# Patient Record
Sex: MALE | NOT HISPANIC OR LATINO | URBAN - METROPOLITAN AREA
[De-identification: names, ages, dates, MRNs, and addresses within clinical notes are randomized per-mention and may not be internally consistent; named-entity substitution may affect disease eponyms.]

---

## 2017-01-25 ENCOUNTER — IMPORTED ENCOUNTER (OUTPATIENT)
Dept: URBAN - METROPOLITAN AREA CLINIC 1 | Facility: CLINIC | Age: 72
End: 2017-01-25

## 2017-01-25 PROBLEM — H25.13: Noted: 2017-01-25

## 2017-01-25 PROBLEM — H35.362: Noted: 2017-01-25

## 2017-01-25 PROBLEM — E11.3293: Noted: 2017-01-25

## 2017-01-25 PROBLEM — Z96.1: Noted: 2017-01-25

## 2017-01-25 PROCEDURE — 92004 COMPRE OPH EXAM NEW PT 1/>: CPT

## 2017-01-25 NOTE — PATIENT DISCUSSION
1.  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 Stressed the importance of strong glucose control. Advised of importance of at least yearly dilated examinations but to contact us immediately for any problems or concerns. 2. Cataract OU: Observe for now without intervention. The patient was advised to contact us if any change or worsening of vision. Change glasses. Advised pt once gets glasses try closing OS to see if helps with symptoms. Will re evaluate re symptoms of visual confusion. 3. Pseudophakia OD - 4. Macular Drusen OS-observe5. Return for an appointment in 4 months for 10 glare with Dr. Yoandy Keene.

## 2017-02-24 ENCOUNTER — OFFICE VISIT (OUTPATIENT)
Dept: CARDIOLOGY CLINIC | Age: 72
End: 2017-02-24

## 2017-02-24 VITALS
OXYGEN SATURATION: 97 % | SYSTOLIC BLOOD PRESSURE: 136 MMHG | BODY MASS INDEX: 30.61 KG/M2 | HEART RATE: 71 BPM | WEIGHT: 226 LBS | HEIGHT: 72 IN | DIASTOLIC BLOOD PRESSURE: 63 MMHG

## 2017-02-24 DIAGNOSIS — I25.10 CORONARY ARTERY DISEASE DUE TO LIPID RICH PLAQUE: Primary | Chronic | ICD-10-CM

## 2017-02-24 DIAGNOSIS — E11.8 TYPE 2 DIABETES MELLITUS WITH COMPLICATION, WITH LONG-TERM CURRENT USE OF INSULIN (HCC): ICD-10-CM

## 2017-02-24 DIAGNOSIS — Z79.4 TYPE 2 DIABETES MELLITUS WITH COMPLICATION, WITH LONG-TERM CURRENT USE OF INSULIN (HCC): ICD-10-CM

## 2017-02-24 DIAGNOSIS — I25.83 CORONARY ARTERY DISEASE DUE TO LIPID RICH PLAQUE: Primary | Chronic | ICD-10-CM

## 2017-02-24 DIAGNOSIS — I10 ESSENTIAL HYPERTENSION: ICD-10-CM

## 2017-02-24 DIAGNOSIS — I50.33 ACUTE ON CHRONIC DIASTOLIC HEART FAILURE (HCC): ICD-10-CM

## 2017-02-24 DIAGNOSIS — I73.9 PVD (PERIPHERAL VASCULAR DISEASE) (HCC): ICD-10-CM

## 2017-02-24 DIAGNOSIS — N18.5 CHRONIC KIDNEY DISEASE, STAGE V (HCC): ICD-10-CM

## 2017-02-24 DIAGNOSIS — I21.4 NSTEMI (NON-ST ELEVATED MYOCARDIAL INFARCTION) (HCC): ICD-10-CM

## 2017-02-24 DIAGNOSIS — E78.5 DYSLIPIDEMIA: ICD-10-CM

## 2017-02-24 NOTE — MR AVS SNAPSHOT
Visit Information Date & Time Provider Department Dept. Phone Encounter #  
 2/24/2017 10:30 AM Daya Kaiser MD [address] [place] [address] Specialist at [facility]/[address] [phone] [id number] Follow-up Instructions Return in about 10 weeks (around 5/5/2017). Your Appointments 5/3/2017 10:30 AM  
Follow Up with Daya Kaiser MD  
Cardio Specialist at [facility] [address]) Appt Note: 10 week f/u  
 [address]  
  
   
 [address] Upcoming Health Maintenance Date Due Hepatitis C Screening 1945 FOOT EXAM Q1 6/30/1956 MICROALBUMIN Q1 6/30/1956 EYE EXAM RETINAL OR DILATED Q1 6/30/1956 DTaP/Tdap/Td series (1 - Tdap) 6/30/1967 FOBT Q 1 YEAR AGE 50-75 6/30/1996 ZOSTER VACCINE AGE 60> 6/30/2006 GLAUCOMA SCREENING Q2Y 6/30/2011 Pneumococcal 65+ High/Highest Risk (1 of 2 - PCV13) 6/30/2011 MEDICARE YEARLY EXAM 6/30/2011 INFLUENZA AGE 9 TO ADULT 8/1/2016 HEMOGLOBIN A1C Q6M 6/27/2017 LIPID PANEL Q1 12/27/2017 Allergies as of 2/24/2017  Review Complete On: 2/24/2017 By: Latoya Mcfarland LPN No Known Allergies Current Immunizations  Never Reviewed No immunizations on file. Not reviewed this visit Vitals BP  
  
  
  
  
  
 136/63 Vitals History BMI and BSA Data Body Mass Index Body Surface Area  
 30.65 kg/m 2 2.28 m 2 Your Updated Medication List  
  
   
This list is accurate as of: 2/24/17 10:58 AM.  Always use your most recent med list.  
  
  
  
  
 allopurinol 100 mg tablet Commonly known as:  Sacha Needle Take 100 mg by mouth daily. amLODIPine 10 mg tablet Commonly known as:  Izetta Harder Take 1 Tab by mouth daily. aspirin delayed-release 325 mg tablet Take 325 mg by mouth every six (6) hours as needed for Pain. atorvastatin 80 mg tablet Commonly known as:  LIPITOR Take 80 mg by mouth daily. carvedilol 25 mg tablet Commonly known as:  Robert Bourdon Take 2 Tabs by mouth two (2) times daily (with meals). cholecalciferol 1,000 unit Cap Commonly known as:  VITAMIN D3 Take 3,000 Units/day by mouth daily. COQ10  100-100 mg-unit Cap Generic drug:  coenzyme q10-vitamin e Take 100 mg by mouth daily. isosorbide mononitrate ER 30 mg tablet Commonly known as:  IMDUR Take 1 Tab by mouth daily. LANTUS SC  
20 Units by SubCUTAneous route two (2) times a day. LEVOTHROID PO Take 0.15 mg by mouth daily. metoclopramide HCl 10 mg tablet Commonly known as:  REGLAN Take 15 mg by mouth Before breakfast, lunch, dinner and at bedtime. NOVOLOG SC  
4 Units by SubCUTAneous route three (3) times daily. PRECISION XTRA TEST strip Generic drug:  glucose blood VI test strips 1 Each by Does Not Apply route See Admin Instructions. tamsulosin 0.4 mg capsule Commonly known as:  FLOMAX Take 0.4 mg by mouth daily. Follow-up Instructions Return in about 10 weeks (around 5/5/2017). Introducing hospitals & HEALTH SERVICES! Ricci Polanco introduces [employer] patient portal. Now you can access parts of your medical record, email your doctor's office, and request medication refills online. 1. In your internet browser, go to [email or web address] 2. Click on the First Time User? Click Here link in the Sign In box. You will see the New Member Sign Up page. 3. Enter your [employer] Access Code exactly as it appears below. You will not need to use this code after youve completed the sign-up process. If you do not sign up before the expiration date, you must request a new code. · [employer] Access Code: [email or web address] Expires: 3/26/2017 12:40 PM 
 
4.  Enter the last four digits of your Social Security Number (xxxx) and Date of Birth (mm/dd/yyyy) as indicated and click Submit. You will be taken to the next sign-up page. 5. Create a [employer] ID. This will be your [employer] login ID and cannot be changed, so think of one that is secure and easy to remember. 6. Create a [employer] password. You can change your password at any time. 7. Enter your Password Reset Question and Answer. This can be used at a later time if you forget your password. 8. Enter your e-mail address. You will receive e-mail notification when new information is available in [address] 9. Click Sign Up. You can now view and download portions of your medical record. 10. Click the Download Summary menu link to download a portable copy of your medical information. If you have questions, please visit the Frequently Asked Questions section of the [employer] website. Remember, [employer] is NOT to be used for urgent needs. For medical emergencies, dial 911. Now available from your iPhone and Android! Please provide this summary of care documentation to your next provider. Your primary care clinician is listed as [address]. If you have any questions after today's visit, please call [phone].

## 2017-02-24 NOTE — LETTER
3/23/2017 Patient:  Avril Haddad YOB: 1945 Date of Visit: 2/24/2017 Dear Guevara Argueta MD 
[address] [facility] 
[place] [address] VIA Facsimile: [phone] 
 : Thank you for referring Mr. Sadie Dominguez to me for evaluation/treatment. Below are the relevant portions of my assessment and plan of care. Subjective:  
   Sadie Dominguez is in the office for cardiac reevaluation. He is a 60-year-old man that was initially referred for revascularization. After his cardiac catheterization cines were obtained from [place], [place] and were reviewed, it was felt that possibly medical therapy would not be an unreasonable option. His catheterization was done on 09/14/2014 so by the time I saw him, a good deal of time had transpired. Recently (12/16), he was hospitalized at [facility] for some change in mental status. He has chronic kidney disease stage 4. He tells me now that he has started dialysis. He says that he was informed at the hospital that his kidneys were nonfunctioning. He has been dialyzed since that time. He has a home health nurse that comes out once per week. His legs limit him more than anything. He does not have any chest discomfort. He has had minimal shortness of breath, but he seems to be fairly sedentary. He was started on Ranexa during that [facility] hospitalization. He is about to have his arm access graft completed some time in the near future. Patient Active Problem List  
 Diagnosis Date Noted  Acute on chronic diastolic heart failure ([address] 12/29/2016  
 NSTEMI (non-ST elevated myocardial infarction) ([address] 12/26/2016  CAD (coronary artery disease) 06/22/2015  
 HTN (hypertension) 06/22/2015  Dyslipidemia 06/22/2015  Diabetes ([address] 06/22/2015  Chronic kidney disease, stage V ([address] 06/22/2015  PVD (peripheral vascular disease) ([address] 06/22/2015  Hypothyroidism 06/22/2015  Diabetic foot ulcer ([facility] 06/22/2015  Diabetic neuropathy ([facility] 06/22/2015 Current Outpatient Prescriptions Medication Sig Dispense Refill  isosorbide mononitrate ER (IMDUR) 30 mg tablet Take 1 Tab by mouth daily. 30 Tab 0  
 amLODIPine (NORVASC) 10 mg tablet Take 1 Tab by mouth daily. 30 Tab 0  carvedilol (COREG) 25 mg tablet Take 2 Tabs by mouth two (2) times daily (with meals). 60 Tab 0  cholecalciferol (VITAMIN D3) 1,000 unit cap Take 3,000 Units/day by mouth daily.  glucose blood VI test strips (PRECISION XTRA TEST) strip 1 Each by Does Not Apply route See Admin Instructions.  coenzyme q10-vitamin e (COQ10 ) 100-100 mg-unit cap Take 100 mg by mouth daily.  LEVOTHYROXINE SODIUM (LEVOTHROID PO) Take 0.15 mg by mouth daily.  aspirin delayed-release 325 mg tablet Take 325 mg by mouth every six (6) hours as needed for Pain.  INSULIN GLARGINE,HUM. REC. ANLOG (LANTUS SC) 20 Units by SubCUTAneous route two (2) times a day.  INSULIN ASPART (NOVOLOG SC) 4 Units by SubCUTAneous route three (3) times daily.  atorvastatin (LIPITOR) 80 mg tablet Take 80 mg by mouth daily.  metoclopramide HCl (REGLAN) 10 mg tablet Take 15 mg by mouth Before breakfast, lunch, dinner and at bedtime.  tamsulosin (FLOMAX) 0.4 mg capsule Take 0.4 mg by mouth daily.  allopurinol (ZYLOPRIM) 100 mg tablet Take 100 mg by mouth daily. No Known Allergies Past Medical History:  
Diagnosis Date  Arrhythmia  [facility]  GERD (gastroesophageal reflux disease)  Gout  High cholesterol  Hypertension  Thyroid disease Past Surgical History:  
Procedure Laterality Date  HX MOHS PROCEDURES Left  HX SHOULDER ARTHROSCOPY No family history on file. History Smoking Status  Never Smoker Smokeless Tobacco  
 Not on file Review of Systems, additional: 
Constitutional: negative Eyes: negative Respiratory: negative Cardiovascular: positive for fatigue, dyspnea on exertion Gastrointestinal: negative Musculoskeletal:negative Neurological: negative Behvioral/Psych: negative Endocrine: negative ENT: negative Objective:  
 
Visit Vitals  /63  Pulse 71  
 Ht 6' (1.829 m)  Wt 102.5 kg (226 lb)  SpO2 97%  BMI 30.65 kg/m2 General:  alert, cooperative, no distress Chest Wall: inspection normal - no chest wall deformities or tenderness, respiratory effort normal  
Lung: clear to auscultation bilaterally Heart:  normal rate and regular rhythm, S1 and S2 normal, no murmurs noted, no gallops noted Abdomen: soft, non-tender. Bowel sounds normal. No masses,  no organomegaly Extremities: extremities normal, atraumatic, no cyanosis or edema Skin: no rashes Neuro: alert, oriented, normal speech, no focal findings or movement disorder noted EK2016; Sinus rhythm. LVE with secondary ST & T wave abnormalities. Assessment/Plan: ICD-10-CM ICD-9-CM 1. Coronary artery disease due to lipid rich plaque I25.10 414.00 I25.83 414.3 2. Essential hypertension, controlled in office today I10 401.9 3. PVD (peripheral vascular disease) ([facility]) I73.9 443.9 4. NSTEMI (non-ST elevated myocardial infarction) [facility]), recently hospitalized at [facility], medical therapy continued I21.4 410.70   
5. Acute on chronic diastolic heart failure ([facility]) I50.33 428.33   
6. Type 2 diabetes mellitus with complication, with long-term current use of insulin ([facility]) E11.8 250.90   
 Z79.4 V58.67   
7. Dyslipidemia E78.5 272.4 8. Chronic kidney disease, stage V ([facility] patient now on hemodialysis N18.5 585.5   
9       Heart failure, chronic, diastolic If you have questions, please do not hesitate to call me. I look forward to following Mr. Gutierrez Luisa along with you. Sincerely, Abdon Harris MD

## 2017-02-24 NOTE — PROGRESS NOTES
1. Have you been to the ER, urgent care clinic since your last visit? Hospitalized since your last visit? Yes When: Dec 2016 Where: [facility] ER Reason for visit: ? mild heart attack, ended up being kidney issue    2. Have you seen or consulted any other health care providers outside of the [address] [place] [address] since your last visit? Include any pap smears or colon screening.  No

## 2017-02-26 PROBLEM — I51.7 LVH (LEFT VENTRICULAR HYPERTROPHY): Status: ACTIVE | Noted: 2017-02-26

## 2017-02-26 NOTE — PROGRESS NOTES
Subjective:      Radha Bailey is in the office for cardiac reevaluation. He is a 46-year-old man that was initially referred for revascularization. After his cardiac catheterization cines were obtained from [place], [place] and were reviewed, it was felt that possibly medical therapy would not be an unreasonable option. His catheterization was done on 09/14/2014 so by the time I saw him, a good deal of time had transpired. Recently (12/16), he was hospitalized at [facility] for some change in mental status. He has chronic kidney disease stage 4. He tells me now that he has started dialysis. He says that he was informed at the hospital that his kidneys were nonfunctioning. He has been dialyzed since that time. He has a home health nurse that comes out once per week. His legs limit him more than anything. He does not have any chest discomfort. He has had minimal shortness of breath, but he seems to be fairly sedentary. He was started on Ranexa during that [facility] hospitalization. He is about to have his arm access graft completed some time in the near future. Patient Active Problem List    Diagnosis Date Noted    Acute on chronic diastolic heart failure ([address] 12/29/2016    NSTEMI (non-ST elevated myocardial infarction) ([address] 12/26/2016    CAD (coronary artery disease) 06/22/2015    HTN (hypertension) 06/22/2015    Dyslipidemia 06/22/2015    Diabetes ([address] 06/22/2015    Chronic kidney disease, stage V ([address] 06/22/2015    PVD (peripheral vascular disease) ([address] 06/22/2015    Hypothyroidism 06/22/2015    Diabetic foot ulcer ([address] 06/22/2015    Diabetic neuropathy ([address] 06/22/2015     Current Outpatient Prescriptions   Medication Sig Dispense Refill    isosorbide mononitrate ER (IMDUR) 30 mg tablet Take 1 Tab by mouth daily. 30 Tab 0    amLODIPine (NORVASC) 10 mg tablet Take 1 Tab by mouth daily.  30 Tab 0    carvedilol (COREG) 25 mg tablet Take 2 Tabs by mouth two (2) times daily (with meals). 60 Tab 0    cholecalciferol (VITAMIN D3) 1,000 unit cap Take 3,000 Units/day by mouth daily.  glucose blood VI test strips (PRECISION XTRA TEST) strip 1 Each by Does Not Apply route See Admin Instructions.  coenzyme q10-vitamin e (COQ10 ) 100-100 mg-unit cap Take 100 mg by mouth daily.  LEVOTHYROXINE SODIUM (LEVOTHROID PO) Take 0.15 mg by mouth daily.  aspirin delayed-release 325 mg tablet Take 325 mg by mouth every six (6) hours as needed for Pain.  INSULIN GLARGINE,HUM. REC. ANLOG (LANTUS SC) 20 Units by SubCUTAneous route two (2) times a day.  INSULIN ASPART (NOVOLOG SC) 4 Units by SubCUTAneous route three (3) times daily.  atorvastatin (LIPITOR) 80 mg tablet Take 80 mg by mouth daily.  metoclopramide HCl (REGLAN) 10 mg tablet Take 15 mg by mouth Before breakfast, lunch, dinner and at bedtime.  tamsulosin (FLOMAX) 0.4 mg capsule Take 0.4 mg by mouth daily.  allopurinol (ZYLOPRIM) 100 mg tablet Take 100 mg by mouth daily. No Known Allergies  Past Medical History:   Diagnosis Date    Arrhythmia     Diabetes ([address]     GERD (gastroesophageal reflux disease)     Gout     High cholesterol     Hypertension     Thyroid disease      Past Surgical History:   Procedure Laterality Date    HX MOHS PROCEDURES Left     HX SHOULDER ARTHROSCOPY       No family history on file.   History   Smoking Status    Never Smoker   Smokeless Tobacco    Not on file          Review of Systems, additional:  Constitutional: negative  Eyes: negative  Respiratory: negative  Cardiovascular: positive for fatigue, dyspnea on exertion  Gastrointestinal: negative  Musculoskeletal:negative  Neurological: negative  Behvioral/Psych: negative  Endocrine: negative  ENT: negative    Objective:     Visit Vitals    /63    Pulse 71    Ht 6' (1.829 m)    Wt 102.5 kg (226 lb)    SpO2 97%    BMI 30.65 kg/m2 General:  alert, cooperative, no distress   Chest Wall: inspection normal - no chest wall deformities or tenderness, respiratory effort normal   Lung: clear to auscultation bilaterally   Heart:  normal rate and regular rhythm, S1 and S2 normal, no murmurs noted, no gallops noted   Abdomen: soft, non-tender. Bowel sounds normal. No masses,  no organomegaly   Extremities: extremities normal, atraumatic, no cyanosis or edema Skin: no rashes   Neuro: alert, oriented, normal speech, no focal findings or movement disorder noted     EK2016; Sinus rhythm. LVE with secondary ST & T wave abnormalities. Assessment/Plan:       ICD-10-CM ICD-9-CM    1. Coronary artery disease due to lipid rich plaque I25.10 414.00     I25.83 414.3    2. Essential hypertension, controlled in office today I10 401.9    3. PVD (peripheral vascular disease) ([facility]) I73.9 443.9    4. NSTEMI (non-ST elevated myocardial infarction) [facility]), recently hospitalized at [facility], medical therapy continued I21.4 410.70    5. Acute on chronic diastolic heart failure ([facility]) I50.33 428.33    6. Type 2 diabetes mellitus with complication, with long-term current use of insulin ([facility]) E11.8 250.90     Z79.4 V58.67    7. Dyslipidemia E78.5 272.4    8.  Chronic kidney disease, stage V ([facility] patient now on hemodialysis N18.5 585.5    9       Heart failure, chronic, diastolic

## 2017-03-12 PROBLEM — N18.6 ESRD (END STAGE RENAL DISEASE) (HCC): Status: ACTIVE | Noted: 2017-03-12

## 2017-03-12 PROBLEM — A41.9 SEPSIS (HCC): Status: ACTIVE | Noted: 2017-03-12

## 2017-03-23 NOTE — COMMUNICATION BODY
Subjective:      Radha Bailey is in the office for cardiac reevaluation. He is a 46-year-old man that was initially referred for revascularization. After his cardiac catheterization cines were obtained from [place], [place] and were reviewed, it was felt that possibly medical therapy would not be an unreasonable option. His catheterization was done on 09/14/2014 so by the time I saw him, a good deal of time had transpired. Recently (12/16), he was hospitalized at [facility] for some change in mental status. He has chronic kidney disease stage 4. He tells me now that he has started dialysis. He says that he was informed at the hospital that his kidneys were nonfunctioning. He has been dialyzed since that time. He has a home health nurse that comes out once per week. His legs limit him more than anything. He does not have any chest discomfort. He has had minimal shortness of breath, but he seems to be fairly sedentary. He was started on Ranexa during that [facility] hospitalization. He is about to have his arm access graft completed some time in the near future. Patient Active Problem List    Diagnosis Date Noted    Acute on chronic diastolic heart failure ([address] 12/29/2016    NSTEMI (non-ST elevated myocardial infarction) ([address] 12/26/2016    CAD (coronary artery disease) 06/22/2015    HTN (hypertension) 06/22/2015    Dyslipidemia 06/22/2015    Diabetes ([address] 06/22/2015    Chronic kidney disease, stage V ([address] 06/22/2015    PVD (peripheral vascular disease) ([address] 06/22/2015    Hypothyroidism 06/22/2015    Diabetic foot ulcer ([address] 06/22/2015    Diabetic neuropathy ([address] 06/22/2015     Current Outpatient Prescriptions   Medication Sig Dispense Refill    isosorbide mononitrate ER (IMDUR) 30 mg tablet Take 1 Tab by mouth daily. 30 Tab 0    amLODIPine (NORVASC) 10 mg tablet Take 1 Tab by mouth daily.  30 Tab 0    carvedilol (COREG) 25 mg tablet Take 2 Tabs by mouth two (2) times daily (with meals). 60 Tab 0    cholecalciferol (VITAMIN D3) 1,000 unit cap Take 3,000 Units/day by mouth daily.  glucose blood VI test strips (PRECISION XTRA TEST) strip 1 Each by Does Not Apply route See Admin Instructions.  coenzyme q10-vitamin e (COQ10 ) 100-100 mg-unit cap Take 100 mg by mouth daily.  LEVOTHYROXINE SODIUM (LEVOTHROID PO) Take 0.15 mg by mouth daily.  aspirin delayed-release 325 mg tablet Take 325 mg by mouth every six (6) hours as needed for Pain.  INSULIN GLARGINE,HUM. REC. ANLOG (LANTUS SC) 20 Units by SubCUTAneous route two (2) times a day.  INSULIN ASPART (NOVOLOG SC) 4 Units by SubCUTAneous route three (3) times daily.  atorvastatin (LIPITOR) 80 mg tablet Take 80 mg by mouth daily.  metoclopramide HCl (REGLAN) 10 mg tablet Take 15 mg by mouth Before breakfast, lunch, dinner and at bedtime.  tamsulosin (FLOMAX) 0.4 mg capsule Take 0.4 mg by mouth daily.  allopurinol (ZYLOPRIM) 100 mg tablet Take 100 mg by mouth daily. No Known Allergies  Past Medical History:   Diagnosis Date    Arrhythmia     Diabetes ([address]     GERD (gastroesophageal reflux disease)     Gout     High cholesterol     Hypertension     Thyroid disease      Past Surgical History:   Procedure Laterality Date    HX MOHS PROCEDURES Left     HX SHOULDER ARTHROSCOPY       No family history on file.   History   Smoking Status    Never Smoker   Smokeless Tobacco    Not on file          Review of Systems, additional:  Constitutional: negative  Eyes: negative  Respiratory: negative  Cardiovascular: positive for fatigue, dyspnea on exertion  Gastrointestinal: negative  Musculoskeletal:negative  Neurological: negative  Behvioral/Psych: negative  Endocrine: negative  ENT: negative    Objective:     Visit Vitals    /63    Pulse 71    Ht 6' (1.829 m)    Wt 102.5 kg (226 lb)    SpO2 97%    BMI 30.65 kg/m2 General:  alert, cooperative, no distress   Chest Wall: inspection normal - no chest wall deformities or tenderness, respiratory effort normal   Lung: clear to auscultation bilaterally   Heart:  normal rate and regular rhythm, S1 and S2 normal, no murmurs noted, no gallops noted   Abdomen: soft, non-tender. Bowel sounds normal. No masses,  no organomegaly   Extremities: extremities normal, atraumatic, no cyanosis or edema Skin: no rashes   Neuro: alert, oriented, normal speech, no focal findings or movement disorder noted     EK2016; Sinus rhythm. LVE with secondary ST & T wave abnormalities. Assessment/Plan:       ICD-10-CM ICD-9-CM    1. Coronary artery disease due to lipid rich plaque I25.10 414.00     I25.83 414.3    2. Essential hypertension, controlled in office today I10 401.9    3. PVD (peripheral vascular disease) ([facility]) I73.9 443.9    4. NSTEMI (non-ST elevated myocardial infarction) [facility]), recently hospitalized at [facility], medical therapy continued I21.4 410.70    5. Acute on chronic diastolic heart failure ([facility]) I50.33 428.33    6. Type 2 diabetes mellitus with complication, with long-term current use of insulin ([facility]) E11.8 250.90     Z79.4 V58.67    7. Dyslipidemia E78.5 272.4    8.  Chronic kidney disease, stage V ([facility] patient now on hemodialysis N18.5 585.5    9       Heart failure, chronic, diastolic

## 2017-05-24 ENCOUNTER — IMPORTED ENCOUNTER (OUTPATIENT)
Dept: URBAN - METROPOLITAN AREA CLINIC 1 | Facility: CLINIC | Age: 72
End: 2017-05-24

## 2017-05-24 PROBLEM — H25.12: Noted: 2017-05-24

## 2017-05-24 PROCEDURE — 92012 INTRM OPH EXAM EST PATIENT: CPT

## 2017-05-24 NOTE — PATIENT DISCUSSION
1.  Cataract OS: Observe for now without intervention. The patient was advised to contact us if any change or worsening of visionDiscussed with pt the need to look through the bottom part of line bifocal to read (demonstrated). Pt has expressed wanting to go back to progressive lenses. Rx given tp pt to take to [place] that [facility] wants progressives. 2. Return for an appointment for December for 27 photo with Dr. Marisa Hunt.

## 2017-12-04 ENCOUNTER — IMPORTED ENCOUNTER (OUTPATIENT)
Dept: URBAN - METROPOLITAN AREA CLINIC 1 | Facility: CLINIC | Age: 72
End: 2017-12-04

## 2017-12-04 PROBLEM — H25.812: Noted: 2017-12-04

## 2017-12-04 PROBLEM — E11.3293: Noted: 2017-12-04

## 2017-12-04 PROBLEM — Z79.4: Noted: 2017-12-04

## 2017-12-04 PROCEDURE — 92250 FUNDUS PHOTOGRAPHY W/I&R: CPT

## 2017-12-04 PROCEDURE — 92014 COMPRE OPH EXAM EST PT 1/>: CPT

## 2017-12-04 NOTE — PATIENT DISCUSSION
1.  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 Stressed the importance of strong glucose control. Advised of importance of at least yearly dilated examinations but to contact us immediately for any problems or concerns2. Cataract OS: Observe for now without intervention. The patient was advised to contact us if any change or worsening of vision. 3. Return for an appointment in 1 year for 30 with Dr. Mai Chamberlain.

## 2018-03-24 PROBLEM — I46.9 CARDIAC ARREST (HCC): Status: ACTIVE | Noted: 2018-01-01

## 2022-04-02 ASSESSMENT — VISUAL ACUITY
OS_GLARE: 20/400
OD_SC: 20/20
OS_SC: 20/40
OD_CC: 20/40
OD_SC: 20/30
OD_GLARE: 20/40
OS_SC: 20/50
OS_CC: 20/60
OS_CC: J2
OD_SC: 20/20-1
OS_SC: 20/30
OD_CC: J2

## 2022-04-02 ASSESSMENT — TONOMETRY
OS_IOP_MMHG: 17
OD_IOP_MMHG: 14
OS_IOP_MMHG: 16
OD_IOP_MMHG: 16
OS_IOP_MMHG: 16
OD_IOP_MMHG: 16